# Patient Record
Sex: FEMALE | Race: BLACK OR AFRICAN AMERICAN | NOT HISPANIC OR LATINO | Employment: STUDENT | URBAN - METROPOLITAN AREA
[De-identification: names, ages, dates, MRNs, and addresses within clinical notes are randomized per-mention and may not be internally consistent; named-entity substitution may affect disease eponyms.]

---
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
## 2023-01-11 ENCOUNTER — HOSPITAL ENCOUNTER (EMERGENCY)
Facility: HOSPITAL | Age: 23
Discharge: HOME | End: 2023-01-11
Attending: INTERNAL MEDICINE
Payer: MEDICAID

## 2023-01-11 VITALS
TEMPERATURE: 100 F | BODY MASS INDEX: 19.97 KG/M2 | RESPIRATION RATE: 20 BRPM | WEIGHT: 120 LBS | SYSTOLIC BLOOD PRESSURE: 114 MMHG | HEART RATE: 79 BPM | DIASTOLIC BLOOD PRESSURE: 81 MMHG | OXYGEN SATURATION: 100 %

## 2023-01-11 DIAGNOSIS — R50.9 FEVER: ICD-10-CM

## 2023-01-11 DIAGNOSIS — B34.9 ACUTE VIRAL SYNDROME: Primary | ICD-10-CM

## 2023-01-11 LAB
ALBUMIN SERPL-MCNC: 3.7 G/DL (ref 3.3–5.5)
ALLENS TEST: ABNORMAL
ALP SERPL-CCNC: 61 U/L (ref 42–141)
B-HCG UR QL: NEGATIVE
BILIRUB SERPL-MCNC: 0.5 MG/DL (ref 0.2–1.6)
BILIRUBIN, POC UA: NEGATIVE
BLOOD, POC UA: NEGATIVE
BUN SERPL-MCNC: 7 MG/DL (ref 7–22)
CALCIUM SERPL-MCNC: 10 MG/DL (ref 8–10.3)
CHLORIDE SERPL-SCNC: 108 MMOL/L (ref 98–108)
CLARITY, POC UA: CLEAR
COLOR, POC UA: YELLOW
CREAT SERPL-MCNC: 0.7 MG/DL (ref 0.6–1.2)
CTP QC/QA: YES
CTP QC/QA: YES
GLUCOSE SERPL-MCNC: 98 MG/DL (ref 73–118)
GLUCOSE, POC UA: NEGATIVE
HCO3 UR-SCNC: 27.2 MMOL/L (ref 24–28)
INFLUENZA A ANTIGEN, POC: NEGATIVE
INFLUENZA B ANTIGEN, POC: NEGATIVE
KETONES, POC UA: NEGATIVE
LDH SERPL L TO P-CCNC: 0.88 MMOL/L (ref 0.5–2.2)
LEUKOCYTE EST, POC UA: NEGATIVE
NITRITE, POC UA: NEGATIVE
PCO2 BLDA: 44.9 MMHG (ref 35–45)
PH SMN: 7.39 [PH] (ref 7.35–7.45)
PH UR STRIP: 8.5 [PH]
PO2 BLDA: 31 MMHG (ref 40–60)
POC ALT (SGPT): 17 U/L (ref 10–47)
POC AST (SGOT): 31 U/L (ref 11–38)
POC BE: 2 MMOL/L
POC SATURATED O2: 59 % (ref 95–100)
POC TCO2: 28 MMOL/L (ref 24–29)
POC TCO2: 29 MMOL/L (ref 18–33)
POTASSIUM BLD-SCNC: 4.8 MMOL/L (ref 3.6–5.1)
PROTEIN, POC UA: NEGATIVE
PROTEIN, POC: 7.7 G/DL (ref 6.4–8.1)
SAMPLE: ABNORMAL
SARS-COV-2 RDRP RESP QL NAA+PROBE: NEGATIVE
SITE: ABNORMAL
SODIUM BLD-SCNC: 144 MMOL/L (ref 128–145)
SPECIFIC GRAVITY, POC UA: 1.02
UROBILINOGEN, POC UA: 1 E.U./DL

## 2023-01-11 PROCEDURE — 87635 SARS-COV-2 COVID-19 AMP PRB: CPT | Mod: ER | Performed by: INTERNAL MEDICINE

## 2023-01-11 PROCEDURE — 99284 EMERGENCY DEPT VISIT MOD MDM: CPT | Mod: 25,ER

## 2023-01-11 PROCEDURE — 25000003 PHARM REV CODE 250: Mod: ER | Performed by: INTERNAL MEDICINE

## 2023-01-11 PROCEDURE — 87804 INFLUENZA ASSAY W/OPTIC: CPT | Mod: ER

## 2023-01-11 PROCEDURE — 81003 URINALYSIS AUTO W/O SCOPE: CPT | Mod: ER

## 2023-01-11 PROCEDURE — 82803 BLOOD GASES ANY COMBINATION: CPT | Mod: ER

## 2023-01-11 PROCEDURE — 81025 URINE PREGNANCY TEST: CPT | Mod: ER | Performed by: PHYSICIAN ASSISTANT

## 2023-01-11 PROCEDURE — 80053 COMPREHEN METABOLIC PANEL: CPT | Mod: ER

## 2023-01-11 PROCEDURE — 96360 HYDRATION IV INFUSION INIT: CPT | Mod: ER

## 2023-01-11 PROCEDURE — 85025 COMPLETE CBC W/AUTO DIFF WBC: CPT | Mod: ER

## 2023-01-11 RX ORDER — FLUTICASONE PROPIONATE 50 MCG
2 SPRAY, SUSPENSION (ML) NASAL DAILY
Qty: 15 G | Refills: 0 | Status: SHIPPED | OUTPATIENT
Start: 2023-01-11

## 2023-01-11 RX ORDER — IBUPROFEN 600 MG/1
600 TABLET ORAL 3 TIMES DAILY
Qty: 30 TABLET | Refills: 0 | Status: SHIPPED | OUTPATIENT
Start: 2023-01-11

## 2023-01-11 RX ORDER — ACETAMINOPHEN 500 MG
1000 TABLET ORAL
Status: COMPLETED | OUTPATIENT
Start: 2023-01-11 | End: 2023-01-11

## 2023-01-11 RX ORDER — AZELASTINE 1 MG/ML
2 SPRAY, METERED NASAL 2 TIMES DAILY
Qty: 30 ML | Refills: 0 | Status: SHIPPED | OUTPATIENT
Start: 2023-01-11 | End: 2023-03-07

## 2023-01-11 RX ADMIN — ACETAMINOPHEN 1000 MG: 500 TABLET, FILM COATED ORAL at 07:01

## 2023-01-11 RX ADMIN — SODIUM CHLORIDE 1000 ML: 9 INJECTION, SOLUTION INTRAVENOUS at 08:01

## 2023-01-12 NOTE — ED PROVIDER NOTES
Encounter Date: 1/11/2023    SCRIBE #1 NOTE: I, Jey Lawton, am scribing for, and in the presence of,  Karlos Arroyo MD. I have scribed the following portions of the note - Other sections scribed: HPI, ROS, PE.     History     Chief Complaint   Patient presents with    Flank Pain     Pt states right flank pain that started 1 hour ago pt denies N&V denies changes in GI/      Arelis Banuelos is a 22 y.o. female, with no PMHx, who presents to the ED with R flank pain beginning 1 hour ago. Patient reports pain worsens with breathing. She notes the pain is sharp when walking. The patient also notes rhinorrhea and cough. No other exacerbating or alleviating factors. Denies nausea, vomiting, or other associated symptoms. Denies changes in GI/.    The history is provided by the patient.   Review of patient's allergies indicates:  No Known Allergies  No past medical history on file.  No past surgical history on file.  No family history on file.  Social History     Tobacco Use    Smoking status: Never    Smokeless tobacco: Never   Substance Use Topics    Alcohol use: Never    Drug use: Never     Review of Systems   Constitutional:  Negative for fever.   HENT:  Positive for rhinorrhea. Negative for sore throat.    Respiratory:  Positive for cough. Negative for shortness of breath.    Cardiovascular:  Negative for chest pain.   Gastrointestinal: Negative.  Negative for nausea and vomiting.   Genitourinary:  Positive for flank pain. Negative for dysuria.   Musculoskeletal:  Negative for back pain.   Skin:  Negative for rash.   Neurological:  Negative for weakness.   Hematological:  Does not bruise/bleed easily.   All other systems reviewed and are negative.    Physical Exam     Initial Vitals [01/11/23 1809]   BP Pulse Resp Temp SpO2   (!) 176/103 (!) 111 20 100 °F (37.8 °C) 100 %      MAP       --         Physical Exam    Nursing note reviewed.  Constitutional: She appears well-developed and well-nourished.   HENT:    Head: Normocephalic and atraumatic.   Nose: Mucosal edema present.   Clr nasal discharge & enlarged nasal turbinates noted.  +Post chaz rachel.  -Matthew to post chaz & exu to post chaz.   Eyes: EOM are normal.   Neck:   Normal range of motion.  Cardiovascular:  Normal rate and regular rhythm.           Pulmonary/Chest: Breath sounds normal. No respiratory distress.   Abdominal: Abdomen is soft. Bowel sounds are normal.   Lat L intercostal TTP with pain upon deep inhalation.   Musculoskeletal:         General: Normal range of motion.      Cervical back: Normal range of motion.     Neurological: She is alert and oriented to person, place, and time. She has normal strength.   Skin: Skin is warm.       ED Course   Procedures  Labs Reviewed   ISTAT PROCEDURE - Abnormal; Notable for the following components:       Result Value    POC PO2 31 (*)     POC SATURATED O2 59 (*)     All other components within normal limits   POCT URINE PREGNANCY   POCT URINALYSIS W/O SCOPE   POCT URINALYSIS W/O SCOPE   SARS-COV-2 RDRP GENE    Narrative:     This test utilizes isothermal nucleic acid amplification technology to detect the SARS-CoV-2 RdRp nucleic acid segment. The analytical sensitivity (limit of detection) is 500 copies/swab.     A POSITIVE result is indicative of the presence of SARS-CoV-2 RNA; clinical correlation with patient history and other diagnostic information is necessary to determine patient infection status.    A NEGATIVE result means that SARS-CoV-2 nucleic acids are not present above the limit of detection. A NEGATIVE result should be treated as presumptive. It does not rule out the possibility of COVID-19 and should not be the sole basis for treatment decisions. If COVID-19 is strongly suspected based on clinical and exposure history, re-testing using an alternate molecular assay should be considered.     This test is only for use under the Food and Drug Administration s Emergency Use Authorization (EUA).     Commercial  kits are provided by [employer]. Performance characteristics of the EUA have been independently verified by [facility] Department of Pathology and Laboratory Medicine.   _________________________________________________________________   The authorized Fact Sheet for Healthcare Providers and the authorized Fact Sheet for Patients of the ID NOW COVID-19 are available on the FDA website:    https://www.fda.gov/media/151036/download      https://www.fda.gov/media/279799/download      POCT CBC   POCT INFLUENZA A/B MOLECULAR   POCT RAPID INFLUENZA A/B   POCT CMP   POCT CMP            Imaging Results              X-Ray Chest PA And Lateral (Final result)  Result time 01/11/23 21:33:33      Final result by Janes Hooper MD (01/11/23 21:33:33)                   Impression:      No acute cardiopulmonary process identified.      Electronically signed by: Janes Hooper MD  Date:    01/11/2023  Time:    21:33               Narrative:    EXAMINATION:  XR CHEST PA AND LATERAL    CLINICAL HISTORY:  Fever, unspecified    TECHNIQUE:  PA and lateral views of the chest were performed.    COMPARISON:  None    FINDINGS:  Cardiac silhouette is normal in size.  Lungs are symmetrically expanded.  No evidence of focal consolidative process, pneumothorax, or significant pleural effusion.  No acute osseous abnormality identified.                                       Medications   sodium chloride 0.9% bolus 1,000 mL 1,000 mL (1,000 mLs Intravenous New Bag 1/11/23 2057)   acetaminophen tablet 1,000 mg (1,000 mg Oral Given 1/11/23 1940)     Medical Decision Making:   History:   Old Medical Records: I decided to obtain old medical records.  Initial Assessment:   Arelis Banuelos is a 22 y.o. female, with no PMHx, who presents to the ED with R flank pain beginning 1 hour ago. Patient reports pain worsens with breathing. She notes the pain is sharp when walking. The patient also notes rhinorrhea and cough. No other  exacerbating or alleviating factors. Denies nausea, vomiting, or other associated symptoms. Denies changes in GI/.  Clinical Tests:   Lab Tests: Ordered and Reviewed  ED Management:  Course of ED stay:  1. Cardiovascular-blood pressure was initially elevated, but normalized without medication.  Patient was chest pain-free throughout ED stay.    2. Pulmonary-patient had normal O2 sats on room air throughout ED stay.  Chest x-ray showed no acute findings.  3. Hematology/infectious disease-rapid flu/COVID-19 screens were negative.  CBC was reassuring.  Serum lactate was normal.  4. GI/nutrition/renal/endocrine-CMP was reassuring.  Normal saline bolus was given.  Patient states flank/abdominal discomfort resolved after fluids and Tylenol.  Patient was given instructions for viral syndrome and received prescriptions for Astelin/fluticasone/ibuprofen.  She was advised to follow-up with her primary care physician within the next week for re-evaluation/return to the emergency department if condition worsens.        Scribe Attestation:   Scribe #1: I performed the above scribed service and the documentation accurately describes the services I performed. I attest to the accuracy of the note.    This document was produced by a scribe under my direction and in my presence. I agree with the content of the note and have made any necessary edits.     Dr. Arroyo    01/11/2023 9:38 PM                   Clinical Impression:   Final diagnoses:  [R50.9] Fever  [B34.9] Acute viral syndrome (Primary)        ED Disposition Condition    Discharge Stable          ED Prescriptions       Medication Sig Dispense Start Date End Date Auth. Provider    ibuprofen (ADVIL,MOTRIN) 600 MG tablet Take 1 tablet (600 mg total) by mouth 3 (three) times daily. 30 tablet 1/11/2023 -- Karlos Arroyo MD    azelastine (ASTELIN) 137 mcg (0.1 %) nasal spray 2 sprays (274 mcg total) by Nasal route 2 (two) times daily. 30 mL 1/11/2023 3/7/2023 Karlos HWANG  MD Cruz    fluticasone propionate (FLONASE) 50 mcg/actuation nasal spray 2 sprays (100 mcg total) by Each Nostril route once daily. 15 g 1/11/2023 -- Karlos Arroyo MD          Follow-up Information       Follow up With Specialties Details Why Contact Info    Katherine Hernandez MD Pediatrics Schedule an appointment as soon as possible for a visit in 1 week For reevaluation [address] [place] [address]  [address]  [facility] [address]  [phone]               Karlos Arroyo MD  01/11/23 4862

## 2024-08-04 ENCOUNTER — HOSPITAL ENCOUNTER (EMERGENCY)
Facility: HOSPITAL | Age: 24
Discharge: HOME | End: 2024-08-04
Attending: EMERGENCY MEDICINE
Payer: MEDICAID

## 2024-08-04 VITALS
BODY MASS INDEX: 18.33 KG/M2 | DIASTOLIC BLOOD PRESSURE: 84 MMHG | HEIGHT: 65 IN | WEIGHT: 110 LBS | HEART RATE: 79 BPM | SYSTOLIC BLOOD PRESSURE: 144 MMHG | RESPIRATION RATE: 16 BRPM | OXYGEN SATURATION: 98 % | TEMPERATURE: 99 F

## 2024-08-04 DIAGNOSIS — M54.50 ACUTE BILATERAL LOW BACK PAIN WITHOUT SCIATICA: Primary | ICD-10-CM

## 2024-08-04 LAB
B-HCG UR QL: NEGATIVE
BILIRUBIN, POC UA: NEGATIVE
BLOOD, POC UA: NEGATIVE
CLARITY, UA POC: CLEAR
COLOR, UA POC: YELLOW
CTP QC/QA: YES
GLUCOSE, POC UA: NEGATIVE
KETONES, POC UA: ABNORMAL
LEUKOCYTE EST, POC UA: NEGATIVE
NITRITE, POC UA: NEGATIVE
PH UR STRIP: 7.5 [PH]
PROTEIN, POC UA: NEGATIVE
SPECIFIC GRAVITY, POC UA: 1.02
UROBILINOGEN, POC UA: 0.2 E.U./DL

## 2024-08-04 PROCEDURE — 81025 URINE PREGNANCY TEST: CPT | Mod: ER

## 2024-08-04 PROCEDURE — 25000003 PHARM REV CODE 250: Mod: ER

## 2024-08-04 PROCEDURE — 81025 URINE PREGNANCY TEST: CPT | Mod: ER | Performed by: EMERGENCY MEDICINE

## 2024-08-04 PROCEDURE — 99284 EMERGENCY DEPT VISIT MOD MDM: CPT | Mod: 25,ER

## 2024-08-04 RX ORDER — LIDOCAINE 50 MG/G
1 PATCH TOPICAL
Status: DISCONTINUED | OUTPATIENT
Start: 2024-08-04 | End: 2024-08-04 | Stop reason: HOSPADM

## 2024-08-04 RX ORDER — IBUPROFEN 600 MG/1
600 TABLET ORAL EVERY 6 HOURS PRN
Qty: 30 TABLET | Refills: 0 | Status: SHIPPED | OUTPATIENT
Start: 2024-08-04

## 2024-08-04 RX ORDER — IBUPROFEN 600 MG/1
600 TABLET ORAL
Status: COMPLETED | OUTPATIENT
Start: 2024-08-04 | End: 2024-08-04

## 2024-08-04 RX ORDER — METHOCARBAMOL 500 MG/1
1000 TABLET, FILM COATED ORAL 3 TIMES DAILY PRN
Qty: 30 TABLET | Refills: 0 | Status: SHIPPED | OUTPATIENT
Start: 2024-08-04

## 2024-08-04 RX ORDER — LIDOCAINE 50 MG/G
1 PATCH TOPICAL DAILY
Qty: 15 PATCH | Refills: 0 | Status: SHIPPED | OUTPATIENT
Start: 2024-08-04

## 2024-08-04 RX ADMIN — LIDOCAINE 1 PATCH: 700 PATCH TOPICAL at 05:08

## 2024-08-04 RX ADMIN — IBUPROFEN 600 MG: 600 TABLET ORAL at 05:08

## 2025-02-10 ENCOUNTER — HOSPITAL ENCOUNTER (EMERGENCY)
Facility: HOSPITAL | Age: 25
Discharge: HOME | End: 2025-02-10
Attending: INTERNAL MEDICINE
Payer: MEDICAID

## 2025-02-10 VITALS
WEIGHT: 110 LBS | SYSTOLIC BLOOD PRESSURE: 116 MMHG | DIASTOLIC BLOOD PRESSURE: 60 MMHG | RESPIRATION RATE: 16 BRPM | HEART RATE: 76 BPM | TEMPERATURE: 99 F | HEIGHT: 65 IN | BODY MASS INDEX: 18.33 KG/M2 | OXYGEN SATURATION: 100 %

## 2025-02-10 DIAGNOSIS — F12.90 CANNABINOID HYPEREMESIS SYNDROME: ICD-10-CM

## 2025-02-10 DIAGNOSIS — R11.2 CANNABINOID HYPEREMESIS SYNDROME: ICD-10-CM

## 2025-02-10 DIAGNOSIS — K52.9 GASTROENTERITIS: Primary | ICD-10-CM

## 2025-02-10 LAB
ALBUMIN SERPL-MCNC: 4.1 G/DL (ref 3.3–5.5)
ALBUMIN SERPL-MCNC: 4.3 G/DL (ref 3.3–5.5)
ALP SERPL-CCNC: 55 U/L (ref 42–141)
ALP SERPL-CCNC: 58 U/L (ref 42–141)
AMPHET+METHAMPHET UR QL: NEGATIVE
B-HCG UR QL: NEGATIVE
BARBITURATES UR QL SCN>200 NG/ML: NEGATIVE
BENZODIAZ UR QL SCN>200 NG/ML: NEGATIVE
BILIRUB SERPL-MCNC: 0.7 MG/DL (ref 0.2–1.6)
BILIRUB SERPL-MCNC: 0.8 MG/DL (ref 0.2–1.6)
BUN SERPL-MCNC: 9 MG/DL (ref 7–22)
BZE UR QL SCN: NEGATIVE
CALCIUM SERPL-MCNC: 9.8 MG/DL (ref 8–10.3)
CANNABINOIDS UR QL SCN: ABNORMAL
CHLORIDE SERPL-SCNC: 106 MMOL/L (ref 98–108)
CREAT SERPL-MCNC: 0.6 MG/DL (ref 0.6–1.2)
CREAT UR-MCNC: 169.3 MG/DL (ref 15–325)
CTP QC/QA: YES
GLUCOSE SERPL-MCNC: 103 MG/DL (ref 73–118)
HCT, POC: NORMAL
HGB, POC: NORMAL (ref 14–18)
INFLUENZA A ANTIGEN, POC: NEGATIVE
INFLUENZA B ANTIGEN, POC: NEGATIVE
MCH, POC: NORMAL
MCHC, POC: NORMAL
MCV, POC: NORMAL
METHADONE UR QL SCN>300 NG/ML: NEGATIVE
MPV, POC: NORMAL
OPIATES UR QL SCN: NEGATIVE
PCP UR QL SCN>25 NG/ML: NEGATIVE
POC ALT (SGPT): 15 U/L (ref 10–47)
POC ALT (SGPT): 15 U/L (ref 10–47)
POC AMYLASE: 54 U/L (ref 14–97)
POC AST (SGOT): 31 U/L (ref 11–38)
POC AST (SGOT): 34 U/L (ref 11–38)
POC GGT: 7 U/L (ref 5–65)
POC PLATELET COUNT: NORMAL
POC TCO2: 25 MMOL/L (ref 18–33)
POTASSIUM BLD-SCNC: 4.8 MMOL/L (ref 3.6–5.1)
PROTEIN, POC: 8.2 G/DL (ref 6.4–8.1)
PROTEIN, POC: 8.3 G/DL (ref 6.4–8.1)
RBC, POC: NORMAL
RDW, POC: NORMAL
SODIUM BLD-SCNC: 142 MMOL/L (ref 128–145)
TOXICOLOGY INFORMATION: ABNORMAL
WBC, POC: NORMAL

## 2025-02-10 PROCEDURE — 82040 ASSAY OF SERUM ALBUMIN: CPT | Mod: 59,ER

## 2025-02-10 PROCEDURE — 81025 URINE PREGNANCY TEST: CPT | Mod: ER

## 2025-02-10 PROCEDURE — 80307 DRUG TEST PRSMV CHEM ANLYZR: CPT | Performed by: INTERNAL MEDICINE

## 2025-02-10 PROCEDURE — 80053 COMPREHEN METABOLIC PANEL: CPT | Mod: ER

## 2025-02-10 PROCEDURE — 63600175 PHARM REV CODE 636 W HCPCS: Mod: ER | Performed by: INTERNAL MEDICINE

## 2025-02-10 PROCEDURE — 99284 EMERGENCY DEPT VISIT MOD MDM: CPT | Mod: 25,ER

## 2025-02-10 PROCEDURE — 96375 TX/PRO/DX INJ NEW DRUG ADDON: CPT | Mod: ER

## 2025-02-10 PROCEDURE — 82150 ASSAY OF AMYLASE: CPT | Mod: ER

## 2025-02-10 PROCEDURE — 85025 COMPLETE CBC W/AUTO DIFF WBC: CPT | Mod: ER

## 2025-02-10 PROCEDURE — 96374 THER/PROPH/DIAG INJ IV PUSH: CPT | Mod: ER

## 2025-02-10 PROCEDURE — 87804 INFLUENZA ASSAY W/OPTIC: CPT | Mod: 59,ER

## 2025-02-10 PROCEDURE — 81025 URINE PREGNANCY TEST: CPT | Mod: ER | Performed by: EMERGENCY MEDICINE

## 2025-02-10 PROCEDURE — 25000003 PHARM REV CODE 250: Mod: ER | Performed by: INTERNAL MEDICINE

## 2025-02-10 RX ORDER — FAMOTIDINE 10 MG/ML
20 INJECTION INTRAVENOUS
Status: COMPLETED | OUTPATIENT
Start: 2025-02-10 | End: 2025-02-10

## 2025-02-10 RX ORDER — ONDANSETRON HYDROCHLORIDE 2 MG/ML
4 INJECTION, SOLUTION INTRAVENOUS
Status: COMPLETED | OUTPATIENT
Start: 2025-02-10 | End: 2025-02-10

## 2025-02-10 RX ORDER — ONDANSETRON 4 MG/1
4 TABLET, ORALLY DISINTEGRATING ORAL EVERY 8 HOURS PRN
Qty: 10 TABLET | Refills: 0 | Status: SHIPPED | OUTPATIENT
Start: 2025-02-10

## 2025-02-10 RX ADMIN — ONDANSETRON 4 MG: 2 INJECTION INTRAMUSCULAR; INTRAVENOUS at 09:02

## 2025-02-10 RX ADMIN — FAMOTIDINE 20 MG: 10 INJECTION, SOLUTION INTRAVENOUS at 09:02

## 2025-02-10 RX ADMIN — SODIUM CHLORIDE 2000 ML: 9 INJECTION, SOLUTION INTRAVENOUS at 09:02

## 2025-02-11 NOTE — ED PROVIDER NOTES
Encounter Date: 2/10/2025    SCRIBE #1 NOTE: I, Arceliaadrian Rodgers, am scribing for, and in the presence of,  Karlos Arroyo MD.       History     Chief Complaint   Patient presents with    Nausea     Starting this am      24 y.o. female with no pertinent PMHx presents to the ED for evaluation of nausea and vomiting that began this morning. She states that she has had 10+ episodes of vomiting today.  Pt reports associated subjective fever. Pt denies any abdominal pain. She denies any recent marijuana use within the past week. No exacerbating or alleviating factors were reported.     The history is provided by the patient. No  was used.     Review of patient's allergies indicates:  No Known Allergies  History reviewed. No pertinent past medical history.  History reviewed. No pertinent surgical history.  No family history on file.  Social History     Tobacco Use    Smoking status: Never    Smokeless tobacco: Never   Substance Use Topics    Alcohol use: Never    Drug use: Never     Review of Systems   Constitutional:  Positive for fever.   HENT:  Negative for sore throat.    Respiratory:  Negative for shortness of breath.    Cardiovascular:  Negative for chest pain.   Gastrointestinal:  Positive for nausea and vomiting. Negative for abdominal pain and diarrhea.   Genitourinary:  Negative for dysuria.   Musculoskeletal:  Negative for back pain.   Skin:  Negative for rash.   Neurological:  Negative for weakness and headaches.   Psychiatric/Behavioral:  Negative for behavioral problems.    All other systems reviewed and are negative.      Physical Exam     Initial Vitals [02/10/25 1714]   BP Pulse Resp Temp SpO2   105/62 80 20 98.5 °F (36.9 °C) 99 %      MAP       --         Physical Exam    Nursing note and vitals reviewed.  Constitutional: She is not diaphoretic. No distress.   Generally uncomfortable appearing. Retching during exam.   HENT:   Head: Normocephalic and atraumatic.   Right Ear: External ear  normal.   Left Ear: External ear normal. Mouth/Throat: Oropharynx is clear and moist.   Eyes: Conjunctivae and EOM are normal. Pupils are equal, round, and reactive to light.   Neck: Neck supple.   Normal range of motion.  Cardiovascular:  Normal rate, regular rhythm, normal heart sounds and intact distal pulses.           Pulmonary/Chest: Breath sounds normal. No respiratory distress. She has no wheezes. She has no rales.   Abdominal: Abdomen is soft. She exhibits no distension. There is no abdominal tenderness.   Musculoskeletal:         General: Normal range of motion.      Cervical back: Normal range of motion and neck supple.     Neurological: She is alert and oriented to person, place, and time. She has normal strength. GCS score is 15. GCS eye subscore is 4. GCS verbal subscore is 5. GCS motor subscore is 6.   Skin: Skin is warm and dry. Capillary refill takes less than 2 seconds.         ED Course   Procedures  Labs Reviewed   DRUG SCREEN PANEL, URINE EMERGENCY - Abnormal       Result Value    Benzodiazepines Negative      Methadone metabolites Negative      Cocaine (Metab.) Negative      Opiate Scrn, Ur Negative      Barbiturate Screen, Ur Negative      Amphetamine Screen, Ur Negative      THC Presumptive Positive (*)     Phencyclidine Negative      Creatinine, Urine 169.3      Toxicology Information SEE COMMENT      Narrative:     Specimen Source->Urine   POCT LIVER PANEL - Abnormal    Albumin, POC 4.3      Alkaline Phosphatase, POC 58      ALT (SGPT), POC 15      Amylase, POC 54      AST (SGOT), POC 31      POC GGT 7      Bilirubin, POC 0.8      Protein, POC 8.3 (*)    POCT CMP - Abnormal    Albumin, POC 4.1      Alkaline Phosphatase, POC 55      ALT (SGPT), POC 15      AST (SGOT), POC 34      POC BUN 9      Calcium, POC 9.8      POC Chloride 106      POC Creatinine 0.6      POC Glucose 103      POC Potassium 4.8      POC Sodium 142      Bilirubin, POC 0.7      POC TCO2 25      Protein, POC 8.2 (*)     POCT URINE PREGNANCY    POC Preg Test, Ur Negative       Acceptable Yes     POCT CBC    Hematocrit        Hemoglobin        RBC        WBC        MCV        MCH, POC        MCHC        RDW-CV        Platelet Count, POC        MPV       POCT CMP   POCT LIVER PANEL   POCT RAPID INFLUENZA A/B    Influenza B Ag negative      Inflenza A Ag negative            Imaging Results    None          Medications   sodium chloride 0.9% bolus 2,000 mL 2,000 mL (2,000 mLs Intravenous New Bag 2/10/25 2130)   famotidine (PF) injection 20 mg (20 mg Intravenous Given 2/10/25 2126)   ondansetron injection 4 mg (4 mg Intravenous Given 2/10/25 2123)     Medical Decision Making  24 y.o. female with no pertinent PMHx presents to the ED for evaluation of nausea and vomiting that began this morning. She states that she has had 10+ episodes of vomiting today.  Pt reports associated subjective fever. Pt denies any abdominal pain. She denies any recent marijuana use within the past week. No exacerbating or alleviating factors were reported.   Course of ED stay:   Patient received Zofran/Pepcid/normal saline bolus and states symptoms resolved after medications/fluids.  UDS was positive for THC.  Instructions for cannabinoid hyperemesis syndrome were given as well as a prescription for Zofran.  Patient was advised to follow-up with her primary care physician within the next week for re-evaluation/return to the emergency department if condition worsens.    Amount and/or Complexity of Data Reviewed  Labs: ordered. Decision-making details documented in ED Course.    Risk  Prescription drug management.            Scribe Attestation:   Scribe #1: I performed the above scribed service and the documentation accurately describes the services I performed. I attest to the accuracy of the note.                           This document was produced by a scribe under my direction and in my presence. I agree with the content of the note and have  made any necessary edits.     Dr. Arroyo    02/11/2025 5:39 AM      Clinical Impression:  Final diagnoses:  [K52.9] Gastroenteritis (Primary)  [R11.2, F12.90] Cannabinoid hyperemesis syndrome          ED Disposition Condition    Discharge Stable          ED Prescriptions       Medication Sig Dispense Start Date End Date Auth. Provider    ondansetron (ZOFRAN-ODT) 4 MG TbDL Take 1 tablet (4 mg total) by mouth every 8 (eight) hours as needed (Nausea). 10 tablet 2/10/2025 -- Karlos Arroyo MD          Follow-up Information       Follow up With Specialties Details Why Contact Info    Katherine Hernandez MD Pediatrics Schedule an appointment as soon as possible for a visit in 2 days For reevaluation [address] [place] [address]  [address]  [place] [address] [phone]               Karlos Arroyo MD  02/11/25 1136